# Patient Record
Sex: MALE | Race: WHITE
[De-identification: names, ages, dates, MRNs, and addresses within clinical notes are randomized per-mention and may not be internally consistent; named-entity substitution may affect disease eponyms.]

---

## 2018-03-11 ENCOUNTER — HOSPITAL ENCOUNTER (EMERGENCY)
Dept: HOSPITAL 17 - NEPI | Age: 73
LOS: 1 days | Discharge: HOME | End: 2018-03-12
Payer: MEDICARE

## 2018-03-11 VITALS
OXYGEN SATURATION: 98 % | HEART RATE: 87 BPM | RESPIRATION RATE: 16 BRPM | SYSTOLIC BLOOD PRESSURE: 154 MMHG | DIASTOLIC BLOOD PRESSURE: 89 MMHG

## 2018-03-11 VITALS
OXYGEN SATURATION: 96 % | RESPIRATION RATE: 16 BRPM | DIASTOLIC BLOOD PRESSURE: 85 MMHG | SYSTOLIC BLOOD PRESSURE: 136 MMHG | HEART RATE: 110 BPM

## 2018-03-11 VITALS — OXYGEN SATURATION: 98 %

## 2018-03-11 VITALS — HEIGHT: 70 IN | WEIGHT: 170.31 LBS | BODY MASS INDEX: 24.38 KG/M2

## 2018-03-11 DIAGNOSIS — F10.920: ICD-10-CM

## 2018-03-11 DIAGNOSIS — S09.90XA: ICD-10-CM
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
## 2018-03-11 DIAGNOSIS — S01.81XA: Primary | ICD-10-CM

## 2018-03-11 DIAGNOSIS — Z23: ICD-10-CM

## 2018-03-11 LAB
BASOPHILS # BLD AUTO: 0 TH/MM3 (ref 0–0.2)
BASOPHILS NFR BLD: 0.5 % (ref 0–2)
EOSINOPHIL # BLD: 0 TH/MM3 (ref 0–0.4)
EOSINOPHIL NFR BLD: 0.3 % (ref 0–4)
ERYTHROCYTE [DISTWIDTH] IN BLOOD BY AUTOMATED COUNT: 13.4 % (ref 11.6–17.2)
HCT VFR BLD CALC: 46.2 % (ref 39–51)
HGB BLD-MCNC: 16.2 GM/DL (ref 13–17)
INR PPP: 1 RATIO
LYMPHOCYTES # BLD AUTO: 2.3 TH/MM3 (ref 1–4.8)
LYMPHOCYTES NFR BLD AUTO: 37.1 % (ref 9–44)
MCH RBC QN AUTO: 34.5 PG (ref 27–34)
MCHC RBC AUTO-ENTMCNC: 35.2 % (ref 32–36)
MCV RBC AUTO: 98.1 FL (ref 80–100)
MONOCYTE #: 0.5 TH/MM3 (ref 0–0.9)
MONOCYTES NFR BLD: 8.7 % (ref 0–8)
NEUTROPHILS # BLD AUTO: 3.3 TH/MM3 (ref 1.8–7.7)
NEUTROPHILS NFR BLD AUTO: 53.4 % (ref 16–70)
PLATELET # BLD: 182 TH/MM3 (ref 150–450)
PMV BLD AUTO: 8.2 FL (ref 7–11)
PROTHROMBIN TIME: 10.2 SEC (ref 9.8–11.6)
RBC # BLD AUTO: 4.71 MIL/MM3 (ref 4.5–5.9)
WBC # BLD AUTO: 6.2 TH/MM3 (ref 4–11)

## 2018-03-11 PROCEDURE — 80048 BASIC METABOLIC PNL TOTAL CA: CPT

## 2018-03-11 PROCEDURE — 85730 THROMBOPLASTIN TIME PARTIAL: CPT

## 2018-03-11 PROCEDURE — 90471 IMMUNIZATION ADMIN: CPT

## 2018-03-11 PROCEDURE — 86900 BLOOD TYPING SEROLOGIC ABO: CPT

## 2018-03-11 PROCEDURE — 96374 THER/PROPH/DIAG INJ IV PUSH: CPT

## 2018-03-11 PROCEDURE — 86901 BLOOD TYPING SEROLOGIC RH(D): CPT

## 2018-03-11 PROCEDURE — 70450 CT HEAD/BRAIN W/O DYE: CPT

## 2018-03-11 PROCEDURE — G0390 TRAUMA RESPONS W/HOSP CRITI: HCPCS

## 2018-03-11 PROCEDURE — 72125 CT NECK SPINE W/O DYE: CPT

## 2018-03-11 PROCEDURE — 85025 COMPLETE CBC W/AUTO DIFF WBC: CPT

## 2018-03-11 PROCEDURE — 72170 X-RAY EXAM OF PELVIS: CPT

## 2018-03-11 PROCEDURE — 99291 CRITICAL CARE FIRST HOUR: CPT

## 2018-03-11 PROCEDURE — 86850 RBC ANTIBODY SCREEN: CPT

## 2018-03-11 PROCEDURE — 70486 CT MAXILLOFACIAL W/O DYE: CPT

## 2018-03-11 PROCEDURE — 80307 DRUG TEST PRSMV CHEM ANLYZR: CPT

## 2018-03-11 PROCEDURE — 85610 PROTHROMBIN TIME: CPT

## 2018-03-11 PROCEDURE — 71045 X-RAY EXAM CHEST 1 VIEW: CPT

## 2018-03-11 PROCEDURE — 81001 URINALYSIS AUTO W/SCOPE: CPT

## 2018-03-11 PROCEDURE — 99285 EMERGENCY DEPT VISIT HI MDM: CPT

## 2018-03-11 NOTE — RADRPT
EXAM DATE/TIME:  03/11/2018 19:06 

 

[facility] COMPARISON:     

No previous studies available for comparison.

 

 

INDICATIONS :     

Trauma alert, fall today.

                      

 

RADIATION DOSE:     

56.35 CTDIvol (mGy) 

 

 

 

MEDICAL HISTORY :     

Non-responsive.  

 

SURGICAL HISTORY :      

Non-responsive. 

 

ENCOUNTER:      

Initial

 

ACUITY:      

1 day

 

PAIN SCALE:      

Non-responsive

 

LOCATION:       

Bilateral  head

 

TECHNIQUE:     

Multiple contiguous axial images were obtained of the head.  Using automated exposure control and adj
ustment of the mA and/or kV according to patient size, radiation dose was kept as low as reasonably a
chievable to obtain optimal diagnostic quality images.   DICOM format image data is available electro
nically for review and comparison.  

 

FINDINGS:     

 

CEREBRUM:     

The ventricles, sulci, and basal cisterns are mildly prominent..  No evidence of midline shift, mass 
lesion, hemorrhage or acute infarction.  No extra-axial fluid collections are seen.

 

POSTERIOR FOSSA:     

The cerebellum and brainstem are intact.  The 4th ventricle is midline.  The cerebellopontine angle i
s unremarkable.

 

EXTRACRANIAL:     

The visualized portion of the orbits is intact.

 

SKULL:     

The calvaria is intact.  No evidence of skull fracture.

 

CONCLUSION:     

1. Mild central and cortical atrophy.

2. No acute findings in the brain.

 

 

 

 Virgil Stauffer MD on March 11, 2018 at 19:23           

Board Certified Radiologist.

 This report was verified electronically.

## 2018-03-11 NOTE — RADRPT
EXAM DATE/TIME:  03/11/2018 18:58 

 

[facility] COMPARISON:     

No previous studies available for comparison.

 

                     

INDICATIONS :     

Trauma Alert: fall from a standing position with head laceration.

                     

 

MEDICAL HISTORY :     

None.          

 

SURGICAL HISTORY :     

None.   

 

ENCOUNTER:     

Initial                                        

 

ACUITY:     

1 day      

 

PAIN SCORE:     

Non-responsive.

 

LOCATION:     

Bilateral chest 

 

FINDINGS:     

Frontal view of the chest is performed supine on a trauma backboard.  The lungs are symmetrically aer
ated.  No evidence of midline shift.  Both hemidiaphragms well delineated.

 

CONCLUSION:     The lungs are clear.

 

 

 

 Virgil Stauffer MD on March 11, 2018 at 19:24           

Board Certified Radiologist.

 This report was verified electronically.

## 2018-03-11 NOTE — RADRPT
EXAM DATE/TIME:  03/11/2018 19:06 

 

[facility] COMPARISON:     

No previous studies available for comparison.

 

 

INDICATIONS :     

Trauma alert, fall today.

                      

 

RADIATION DOSE:     

29.8 CTDIvol (mGy) 

 

 

 

MEDICAL HISTORY :     

Non-responsive.  

 

SURGICAL HISTORY :      

Non-responsive. 

 

ENCOUNTER:      

Initial

 

ACUITY:      

1 day

 

PAIN SCALE:      

Non-responsive

 

LOCATION:       

Bilateral neck 

 

TECHNIQUE:     

Volumetric scanning of the cervical spine was performed. Multiplanar reconstructions in the sagittal,
 coronal and oblique axial planes were performed.   Using automated exposure control and adjustment o
f the mA and/or kV according to patient size, radiation dose was kept as low as reasonably achievable
 to obtain optimal diagnostic quality images.   DICOM format image data is available electronically f
or review and comparison.  

 

FINDINGS:     

There is normal alignment of the vertebral bodies of the cervical spine and preservation of vertebral
 body height.  Prominent anterior bridging paravertebral ossification is present anteriorly on the le
ft side at C6-7.  Mild posterior osteophytes are present from C3-C7.  The posterior elements are in n
ormal alignment without evidence of locked or perched facets.  There is fusion of the facet joints on
 the left side at C3-4-5.  The spinous processes are intact.  Soft tissue ossification superficial to
 the spinous processes of C4.  The atlantoaxial articulation is intact.

 

C2-C3:  

No fracture seen.  The neural foramina are patent.

 

C3-C4: 

No fracture seen.  Moderate left sided bony neural foraminal stenosis.

 

C4-C5: 

No fracture seen.  Moderate right-sided bony neural foraminal stenosis.

 

C5-C6: 

No fracture seen.  Moderate left and mild right-sided bony neural foraminal stenosis

 

C6-C7: 

No fracture seen.  Moderate severity left bony neural foraminal stenosis.

 

C7-T1: 

No fracture seen.  The neural foramina are patent.

 

CONCLUSION:     

No acute findings.  No evidence of compression deformity or spondylolisthesis.  

 

 

 

 Virgil Stauffer MD on March 11, 2018 at 19:41           

Board Certified Radiologist.

 This report was verified electronically.

## 2018-03-11 NOTE — RADRPT
EXAM DATE/TIME:  03/11/2018 19:06 

 

[facility] COMPARISON:     

No previous studies available for comparison.

 

 

INDICATIONS :     

Trauma alert, fall today.

                      

 

RADIATION DOSE:     

26.35 CTDIvol (mGy) 

 

 

MEDICAL HISTORY :     

Non-responsive.  

 

SURGICAL HISTORY :      

Non-responsive. 

 

ENCOUNTER:      

Initial

 

ACUITY:      

1 day

 

PAIN SCORE:      

Non-responsive

 

LOCATION:       

Bilateral facial 

 

TECHNIQUE:     

Volumetric scanning of the facial bones was performed.  Using automated exposure control and adjustme
nt of the mA and/or kV according to patient size, radiation dose was kept as low as reasonably achiev
able to obtain optimal diagnostic quality images.  DICOM format image data is available [employer]
y for review and comparison.  

 

FINDINGS:     

 

ORBITS:     

The orbital and infraorbital osseous structures are intact.  The retroconal structures have a normal 
configuration.  No radiopaque foreign bodies are seen.

 

NASAL BONE:     

The nasal bone and maxillary spine are intact

 

ZYGOMATIC ARCHES:     

Symmetric without evidence of fracture.

 

SINUSES:     

The maxillary, ethmoid and frontal sinuses are intact.  No air-fluid levels seen.

 

NASAL CAVITY:     

The nasal septum is intact and midline.  The lacrimal ducts are intact.

 

SOFT TISSUES:     

No radiopaque foreign bodies seen.  No soft-tissue swelling is seen.

 

INTRACRANIAL:     

No intracranial air seen.

 

CRIBIFORM PLATE:     

Grossly intact.

 

CONCLUSION:     

Negative trauma CT facial bones.

 

 

 

 Virgil Stauffer MD on March 11, 2018 at 19:25           

Board Certified Radiologist.

 This report was verified electronically.

## 2018-03-11 NOTE — PD
Physical Exam


Date Seen by Provider:  Mar 11, 2018


Time Seen by Provider:  20:38





Data


Data


Last Documented VS





Vital Signs








  Date Time  Temp Pulse Resp B/P (MAP) Pulse Ox O2 Delivery O2 Flow Rate FiO2


 


3/11/18 19:28  87 16 154/89 (110) 98 Nasal Cannula 2.00 








Orders





 Orders


I-Stat Profile (3/11/18 19:03)


Complete Blood Count With Diff (3/11/18 19:03)


Prothrombin Time / Inr (Pt) (3/11/18 19:03)


Act Partial Throm Time (Ptt) (3/11/18 19:03)


Type And Screen (3/11/18 19:03)


Chest, Single Ap (3/11/18 19:03)


Pelvis, Ap Only (Routine) (3/11/18 19:03)


Ct Brain W/O Iv Contrast(Rout) (3/11/18 19:03)


Ct Cerv Spine W/O Contrast (3/11/18 19:03)


Iv Access Insert/Monitor (3/11/18 19:03)


Ecg Monitoring (3/11/18 19:03)


Oximetry (3/11/18 19:03)


Oxygen Administration (3/11/18 19:03)


Ct Facial Bones W/O Iv Cont (3/11/18 )


Lorazepam Inj (Ativan Inj) (3/11/18 19:15)


Lorazepam Inj (Ativan Inj) (3/11/18 19:15)


Alcohol (Ethanol) (3/11/18 19:39)


Haloperidol Inj (Haldol Inj) (3/11/18 20:15)


Lorazepam Inj (Ativan Inj) (3/11/18 22:15)


**Trauma Office Use Only** (3/11/18 )





Labs





Laboratory Tests








Test


  3/11/18


19:00


 


White Blood Count 6.2 TH/MM3 


 


Red Blood Count 4.71 MIL/MM3 


 


Hemoglobin 16.2 GM/DL 


 


Bedside Hemoglobin 16.3 G/DL 


 


Hematocrit 46.2 % 


 


Bedside Hematocrit 48.0 % 


 


Mean Corpuscular Volume 98.1 FL 


 


Mean Corpuscular Hemoglobin 34.5 PG 


 


Mean Corpuscular Hemoglobin


Concent 35.2 % 


 


 


Red Cell Distribution Width 13.4 % 


 


Platelet Count 182 TH/MM3 


 


Mean Platelet Volume 8.2 FL 


 


Neutrophils (%) (Auto) 53.4 % 


 


Lymphocytes (%) (Auto) 37.1 % 


 


Monocytes (%) (Auto) 8.7 % 


 


Eosinophils (%) (Auto) 0.3 % 


 


Basophils (%) (Auto) 0.5 % 


 


Neutrophils # (Auto) 3.3 TH/MM3 


 


Lymphocytes # (Auto) 2.3 TH/MM3 


 


Monocytes # (Auto) 0.5 TH/MM3 


 


Eosinophils # (Auto) 0.0 TH/MM3 


 


Basophils # (Auto) 0.0 TH/MM3 


 


CBC Comment DIFF FINAL 


 


Differential Comment  


 


Prothrombin Time 10.2 SEC 


 


Prothromb Time International


Ratio 1.0 RATIO 


 


 


Activated Partial


Thromboplast Time 21.6 SEC 


 


 


Bedside Sodium 138 MMOL/L 


 


Bedside Potassium 4.0 MMOL/L 


 


Bedside Chloride 100 MMOL/L 


 


Bedside Blood Urea Nitrogen 6 MG/DL 


 


Bedside Creatinine 1.0 MG/DL 


 


Bedside Glucose 94 MG/DL 


 


Ethyl Alcohol Level 253 MG/DL 











MDM


Supervised Visit with SHARON:  No


Narrative Course


I was asked to evaluate this patient's left forehead laceration.


The patient was initially seen by Dr. Sampson.  Please see his note for full H&P.  


On my exam the patient is belligerent, refusing examination. He says, " Get the 

fuck away from me. Don't fucking touch me." He is intoxicated, smells of 

alcohol. 


I can see a 3-4 cm laceration of the left forehead. No active bleeding. Patient 

may be more agreeable to suturing after he nancy up.


Dr. Sampson retains care of this patient.  Please see his note for disposition.


Diagnosis





 Primary Impression:  


 Closed head injury


 Qualified Codes:  S09.90XA - Unspecified injury of head, initial encounter


 Additional Impressions:  


 Forehead laceration


 Qualified Codes:  S01.81XA - Laceration without foreign body of other part of 

head, initial encounter


 Alcohol intoxication


 Qualified Codes:  F10.920 - Alcohol use, unspecified with intoxication, 

uncomplicated


Patient Instructions:  General Instructions





***Additional Instruction:  


Follow-up with local physician.  Wound care daily.  Keflex as directed.


Scripts


Unable to Obtain Active Prescriptions or Reported Meds


Disposition:  01 DISCHARGE HOME


Condition:  Stable











Donna Membreno Mar 11, 2018 20:40

## 2018-03-11 NOTE — RADRPT
EXAM DATE/TIME:  03/11/2018 18:58 

 

[facility] COMPARISON:     

No previous studies available for comparison.

 

                     

INDICATIONS :     

Trauma Alert: fall from a standing position with head laceration.

                     

 

MEDICAL HISTORY :     

None.          

 

SURGICAL HISTORY :     

None.   

 

ENCOUNTER:     

Initial                                        

 

ACUITY:     

1 day      

 

PAIN SCORE:     

Non-responsive.

 

LOCATION:     

Bilateral pelvis 

 

FINDINGS:     

Frontal view of the pelvis performed on a trauma backboard.  There is mild motion degradation.  No gr
oss bony abnormality seen.  Metallic zippers superimposes upon a portion of the superior and inferior
 left pubic ramus.  The arcuate lines of the sacrum are symmetric.

 

CONCLUSION:     

The bony pelvic ring is intact.  No gross abnormality seen.

 

 

 

 Virgil Stauffer MD on March 11, 2018 at 19:25           

Board Certified Radiologist.

 This report was verified electronically.

## 2018-03-11 NOTE — PD
HPI


Chief Complaint:  Trauma alert


Time Seen by Provider:  19:03


Travel History


International Travel<30 days:  No


Contact w/Intl Traveler<30days:  No


Traveled to known affect area:  No





History of Present Illness


HPI


72-year-old male was brought in trauma alert.  Patient had alcohol consumption 

today.  Patient fell this evening.  No reported loss of consciousness.  Patient 

was combative at the scene.  GCS was 14.  Patient was observed to have his 

laceration the left side of the head.  Patient denies any past medical history.

  Patient denies any routine medication.  Patient denies any allergies to 

medication.  Patient is not sure TD booster status.  Patient complains of left-

sided headache.  Patient denies any neck pain.  Patient denies any chest pain 

or shortness of breath.  Patient denies abdominal pain.  Patient denies any 

extremity injury.  Patient denies any focal weakness or numbness of the 

extremity.





Allergies-Medications


(Allergen,Severity, Reaction):  


Coded Allergies:  


     No Known Allergies (Unverified , 3/11/18)


Reported Meds & Prescriptions





Reported Meds & Active Scripts


Active


Active Prescriptions or Reported Medications Unobtainable    








Review of Systems


General / Constitutional:  No: Fever


Eyes:  No: Visual changes


HENT:  Positive: Headaches


Cardiovascular:  No: Chest Pain or Discomfort


Respiratory:  No: Shortness of Breath


Gastrointestinal:  No: Abdominal Pain


Genitourinary:  No: Dysuria


Musculoskeletal:  No: Pain


Skin:  No Rash


Neurologic:  No: Weakness


Psychiatric:  No: Depression


Endocrine:  No: Polydipsia


Hematologic/Lymphatic:  No: Easy Bruising





Physical Exam


Narrative


GENERAL: Well-nourished, well-developed patient.


SKIN: Focused skin assessment warm/dry.


HEAD: Normocephalic.  Patient has 4 cm laceration left forehead temporal area 

of the scalp.  No active bleeding.


EYES: No scleral icterus. No injection or drainage.  Pupils 2 mm equal reactive.


NECK: Supple, trachea midline. No JVD or lymphadenopathy.  No tenderness on 

palpation of the neck.


CARDIOVASCULAR: Regular rate and rhythm without murmurs, gallops, or rubs. 


RESPIRATORY: Breath sounds equal bilaterally. No accessory muscle use.


GASTROINTESTINAL: Abdomen soft, non-tender, nondistended. 


MUSCULOSKELETAL: No cyanosis, or edema. 


BACK: Nontender without obvious deformity. No CVA tenderness.


Neurologic exam: Patient is awake and alert oriented 3.  Patient is 

uncooperative however answer questions appropriately.  Patient moves all 

extremity well.  No obvious focal neurological deficit.





Data


Data


Last Documented VS





Vital Signs








  Date Time  Temp Pulse Resp B/P (MAP) Pulse Ox O2 Delivery O2 Flow Rate FiO2


 


3/11/18 19:28  87 16 154/89 (110) 98 Nasal Cannula 2.00 








Orders





 Orders


I-Stat Profile (3/11/18 19:03)


Complete Blood Count With Diff (3/11/18 19:03)


Prothrombin Time / Inr (Pt) (3/11/18 19:03)


Act Partial Throm Time (Ptt) (3/11/18 19:03)


Type And Screen (3/11/18 19:03)


Chest, Single Ap (3/11/18 19:03)


Pelvis, Ap Only (Routine) (3/11/18 19:03)


Ct Brain W/O Iv Contrast(Rout) (3/11/18 19:03)


Ct Cerv Spine W/O Contrast (3/11/18 19:03)


Iv Access Insert/Monitor (3/11/18 19:03)


Ecg Monitoring (3/11/18 19:03)


Oximetry (3/11/18 19:03)


Oxygen Administration (3/11/18 19:03)


Ct Facial Bones W/O Iv Cont (3/11/18 )


Lorazepam Inj (Ativan Inj) (3/11/18 19:15)


Lorazepam Inj (Ativan Inj) (3/11/18 19:15)


Alcohol (Ethanol) (3/11/18 19:39)


Haloperidol Inj (Haldol Inj) (3/11/18 20:15)





Labs





Laboratory Tests








Test


  3/11/18


19:00


 


White Blood Count 6.2 TH/MM3 


 


Red Blood Count 4.71 MIL/MM3 


 


Hemoglobin 16.2 GM/DL 


 


Bedside Hemoglobin 16.3 G/DL 


 


Hematocrit 46.2 % 


 


Bedside Hematocrit 48.0 % 


 


Mean Corpuscular Volume 98.1 FL 


 


Mean Corpuscular Hemoglobin 34.5 PG 


 


Mean Corpuscular Hemoglobin


Concent 35.2 % 


 


 


Red Cell Distribution Width 13.4 % 


 


Platelet Count 182 TH/MM3 


 


Mean Platelet Volume 8.2 FL 


 


Neutrophils (%) (Auto) 53.4 % 


 


Lymphocytes (%) (Auto) 37.1 % 


 


Monocytes (%) (Auto) 8.7 % 


 


Eosinophils (%) (Auto) 0.3 % 


 


Basophils (%) (Auto) 0.5 % 


 


Neutrophils # (Auto) 3.3 TH/MM3 


 


Lymphocytes # (Auto) 2.3 TH/MM3 


 


Monocytes # (Auto) 0.5 TH/MM3 


 


Eosinophils # (Auto) 0.0 TH/MM3 


 


Basophils # (Auto) 0.0 TH/MM3 


 


CBC Comment DIFF FINAL 


 


Differential Comment  


 


Prothrombin Time 10.2 SEC 


 


Prothromb Time International


Ratio 1.0 RATIO 


 


 


Activated Partial


Thromboplast Time 21.6 SEC 


 


 


Bedside Sodium 138 MMOL/L 


 


Bedside Potassium 4.0 MMOL/L 


 


Bedside Chloride 100 MMOL/L 


 


Bedside Blood Urea Nitrogen 6 MG/DL 


 


Bedside Creatinine 1.0 MG/DL 


 


Bedside Glucose 94 MG/DL 











[facility]


Medical Screen Exam Complete:  Yes


Emergency Medical Condition:  Yes


Interpretation(s)





Last Impressions








Pelvis X-Ray 3/11/18 1903 Signed





Impressions: 





 Service Date/Time:  Sunday, March 11, 2018 18:58 - CONCLUSION:  The bony 

pelvic 





 ring is intact.  No gross abnormality seen.     Virgil Stauffer MD 


 


Head CT 3/11/18 1903 Signed





Impressions: 





 Service Date/Time:  Sunday, March 11, 2018 19:06 - CONCLUSION:  1. Mild 

central 





 and cortical atrophy. 2. No acute findings in the brain.     Virgil Stauffer MD 


 


Chest X-Ray 3/11/18 1903 Signed





Impressions: 





 Service Date/Time:  Sunday, March 11, 2018 18:58 - CONCLUSION: The lungs are 





 clear.     Virgil Stauffer MD 


 


Cervical Spine CT 3/11/18 1903 Signed





Impressions: 





 Service Date/Time:  Sunday, March 11, 2018 19:06 - CONCLUSION:  No acute 





 findings.  No evidence of compression deformity or spondylolisthesis.       





 Virgil Stauffer MD 


 


Maxillofacial CT 3/11/18 0000 Signed





Impressions: 





 Service Date/Time:  Sunday, March 11, 2018 19:06 - CONCLUSION:  Negative 

trauma 





 CT facial bones.     Virgil Stauffer MD 








Differential Diagnosis


Differential diagnosis including head injury, neck injury, facial injury, chest 

injury, abdominal injury, extremity injury.


Narrative Course


72-year-old male was found in by EMS trauma alert.  Patient was intoxicated and 

fell and has had injury.  Td booster given.  Ancef 2 g IV given.  Ativan IV as 

necessary for controlling combative behavior.  Haldol 5 mg IM given.  Patient 

refused suture repair of the left forehead scalp laceration.


Diagnosis


Diagnosis:  


 Primary Impression:  


 Closed head injury


 Qualified Codes:  S09.90XA - Unspecified injury of head, initial encounter


 Additional Impressions:  


 Forehead laceration


 Qualified Codes:  S01.81XA - Laceration without foreign body of other part of 

head, initial encounter


 Alcohol intoxication


 Qualified Codes:  F10.920 - Alcohol use, unspecified with intoxication, 

uncomplicated


Patient Instructions:  General Instructions





***Additional Instructions:  


Follow-up with local physician.  Wound care daily.  Keflex as directed.


***Med/Other Pt SpecificInfo:  No Meds Exist/No RX given


Scripts


Unable to Obtain Active Prescriptions or Reported Meds


Disposition:  01 DISCHARGE HOME


Condition:  Stable











Weston Sampson MD Mar 11, 2018 19:18

## 2018-03-12 VITALS
RESPIRATION RATE: 15 BRPM | DIASTOLIC BLOOD PRESSURE: 88 MMHG | HEART RATE: 106 BPM | OXYGEN SATURATION: 94 % | SYSTOLIC BLOOD PRESSURE: 144 MMHG

## 2018-03-12 VITALS
DIASTOLIC BLOOD PRESSURE: 89 MMHG | OXYGEN SATURATION: 96 % | HEART RATE: 130 BPM | SYSTOLIC BLOOD PRESSURE: 126 MMHG | RESPIRATION RATE: 18 BRPM

## 2018-03-12 VITALS
DIASTOLIC BLOOD PRESSURE: 82 MMHG | SYSTOLIC BLOOD PRESSURE: 165 MMHG | RESPIRATION RATE: 18 BRPM | HEART RATE: 100 BPM | OXYGEN SATURATION: 100 %

## 2018-03-12 LAB
COLOR UR: (no result)
GLUCOSE UR STRIP-MCNC: (no result) MG/DL
HGB UR QL STRIP: (no result)
KETONES UR STRIP-MCNC: (no result) MG/DL
NITRITE UR QL STRIP: (no result)
SP GR UR STRIP: 1.01 (ref 1–1.03)
URINE LEUKOCYTE ESTERASE: (no result)

## 2018-03-12 NOTE — PD
Physical Exam


Date Seen by Provider:  Mar 12, 2018


Narrative


This patient came in last night as a trauma alert.  He had been drinking and 

fell and hit his head.  He was worked up from a trauma aspect and no acute 

injuries were found.  His alcohol level was 253.  He was kept here overnight as 

a "sleep it off."





At 9 AM this morning, the patient's mental status remains clearly altered.  He 

had been given Ativan prior to my evaluation of the patient.  I am told by the 

PA that he was able to tell her his name but was disoriented to month/day/year.

  He was unable to recall the name of his friend to come pick him up.





The patient admitted to drinking 3 12-packs of beer per day and was tachycardic 

this morning.  He was given Ativan for presumed alcohol withdrawal.





This patient is very disheveled.  He has dried blood on the left side of his 

head.  He has feces on his feet.  He will open his eyes when spoken to but does 

not speak and appears confused.  He is not ready for discharge at this point.





I have reviewed his studies from last night.  I have added a urine drug screen 

and a urinalysis.





We are attempting to find out this patient's name so that I can review records.





Data


Data


Last Documented VS





Vital Signs








  Date Time  Temp Pulse Resp B/P (MAP) Pulse Ox O2 Delivery O2 Flow Rate FiO2


 


3/12/18 08:25  106 15 144/88 (106) 94 Room Air  


 


3/12/18 01:24       2.00 


 


3/11/18 18:57        21








Orders





 Orders


I-Stat Profile (3/11/18 19:03)


Complete Blood Count With Diff (3/11/18 19:03)


Prothrombin Time / Inr (Pt) (3/11/18 19:03)


Act Partial Throm Time (Ptt) (3/11/18 19:03)


Type And Screen (3/11/18 19:03)


Chest, Single Ap (3/11/18 19:03)


Pelvis, Ap Only (Routine) (3/11/18 19:03)


Ct Brain W/O Iv Contrast(Rout) (3/11/18 19:03)


Ct Cerv Spine W/O Contrast (3/11/18 19:03)


Iv Access Insert/Monitor (3/11/18 19:03)


Ecg Monitoring (3/11/18 19:03)


Oximetry (3/11/18 19:03)


Oxygen Administration (3/11/18 19:03)


Ct Facial Bones W/O Iv Cont (3/11/18 )


Lorazepam Inj (Ativan Inj) (3/11/18 19:15)


Lorazepam Inj (Ativan Inj) (3/11/18 19:15)


Alcohol (Ethanol) (3/11/18 19:39)


Haloperidol Inj (Haldol Inj) (3/11/18 20:15)


Lorazepam Inj (Ativan Inj) (3/11/18 22:15)


**Trauma Office Use Only** (3/11/18 )


Lorazepam Inj (Ativan Inj) (3/12/18 08:30)


Sodium Chlor 0.9% 1000 Ml Inj (Ns 1000 M (3/12/18 08:30)


Cath For Specimen (3/12/18 09:33)


Urinalysis - C+S If Indicated (3/12/18 09:33)


Drug Screen, Random Urine (3/12/18 09:33)


Diet Regular Basic (3/12/18 Lunch)


Ed Discharge Order (3/12/18 12:46)





Labs





Laboratory Tests








Test


  3/11/18


19:00 3/12/18


11:30


 


White Blood Count 6.2 TH/MM3  


 


Red Blood Count 4.71 MIL/MM3  


 


Hemoglobin 16.2 GM/DL  


 


Bedside Hemoglobin 16.3 G/DL  


 


Hematocrit 46.2 %  


 


Bedside Hematocrit 48.0 %  


 


Mean Corpuscular Volume 98.1 FL  


 


Mean Corpuscular Hemoglobin 34.5 PG  


 


Mean Corpuscular Hemoglobin


Concent 35.2 % 


  


 


 


Red Cell Distribution Width 13.4 %  


 


Platelet Count 182 TH/MM3  


 


Mean Platelet Volume 8.2 FL  


 


Neutrophils (%) (Auto) 53.4 %  


 


Lymphocytes (%) (Auto) 37.1 %  


 


Monocytes (%) (Auto) 8.7 %  


 


Eosinophils (%) (Auto) 0.3 %  


 


Basophils (%) (Auto) 0.5 %  


 


Neutrophils # (Auto) 3.3 TH/MM3  


 


Lymphocytes # (Auto) 2.3 TH/MM3  


 


Monocytes # (Auto) 0.5 TH/MM3  


 


Eosinophils # (Auto) 0.0 TH/MM3  


 


Basophils # (Auto) 0.0 TH/MM3  


 


CBC Comment DIFF FINAL  


 


Differential Comment   


 


Prothrombin Time 10.2 SEC  


 


Prothromb Time International


Ratio 1.0 RATIO 


  


 


 


Activated Partial


Thromboplast Time 21.6 SEC 


  


 


 


Bedside Sodium 138 MMOL/L  


 


Bedside Potassium 4.0 MMOL/L  


 


Bedside Chloride 100 MMOL/L  


 


Bedside Blood Urea Nitrogen 6 MG/DL  


 


Bedside Creatinine 1.0 MG/DL  


 


Bedside Glucose 94 MG/DL  


 


Ethyl Alcohol Level 253 MG/DL  


 


Urine Color  LIGHT-YELLOW 


 


Urine Turbidity  CLEAR 


 


Urine pH  6.5 


 


Urine Specific Gravity  1.006 


 


Urine Protein  NEG mg/dL 


 


Urine Glucose (UA)  NEG mg/dL 


 


Urine Ketones  NEG mg/dL 


 


Urine Occult Blood  NEG 


 


Urine Nitrite  NEG 


 


Urine Bilirubin  NEG 


 


Urine Urobilinogen


  


  LESS THAN 2.0


MG/DL


 


Urine Leukocyte Esterase  NEG 


 


Urine RBC


  


  LESS THAN 1


/hpf


 


Urine WBC  1 /hpf 


 


Microscopic Urinalysis Comment


  


  CATH-CULT NOT


IND


 


Urine Opiates Screen  NEG 


 


Urine Barbiturates Screen  NEG 


 


Urine Amphetamines Screen  NEG 


 


Urine Benzodiazepines Screen  NEG 


 


Urine Cocaine Screen  NEG 


 


Urine Cannabinoids Screen  NEG 











MDM


Supervised Visit with SHARNO:  No


Differential Diagnosis


Differential diagnosis of altered mental status includes but is not limited to 

infection, electrolyte abnormality, neurological event, intoxication


Narrative Course





Last Impressions








Pelvis X-Ray 3/11/18 1903 Signed





Impressions: 





 Service Date/Time:  Sunday, March 11, 2018 18:58 - CONCLUSION:  The bony 

pelvic 





 ring is intact.  No gross abnormality seen.     Virgil Stauffer MD 


 


Head CT 3/11/18 1903 Signed





Impressions: 





 Service Date/Time:  Sunday, March 11, 2018 19:06 - CONCLUSION:  1. Mild 

central 





 and cortical atrophy. 2. No acute findings in the brain.     Virgil Stauffer MD 


 


Chest X-Ray 3/11/18 1903 Signed





Impressions: 





 Service Date/Time:  Sunday, March 11, 2018 18:58 - CONCLUSION: The lungs are 





 clear.     Virgil Stauffer MD 


 


Cervical Spine CT 3/11/18 1903 Signed





Impressions: 





 Service Date/Time:  Sunday, March 11, 2018 19:06 - CONCLUSION:  No acute 





 findings.  No evidence of compression deformity or spondylolisthesis.       





 Virgil Stauffer MD 


 


Maxillofacial CT 3/11/18 0000 Signed





Impressions: 





 Service Date/Time:  Sunday, March 11, 2018 19:06 - CONCLUSION:  Negative 

trauma 





 CT facial bones.     Virgil Stauffer MD 











CBC Diagram


3/11/18 19:00














I-STAT chemistries were normal.  Alcohol level 253.





12:45 PM


This patient is much more lucid.  He now has a ride here for him.





UA is negative for infection.  Urine tox screen is also negative.  He is now 

stable for discharge.


Diagnosis





 Primary Impression:  


 Closed head injury


 Qualified Codes:  S09.90XA - Unspecified injury of head, initial encounter


 Additional Impressions:  


 Forehead laceration


 Qualified Codes:  S01.81XA - Laceration without foreign body of other part of 

head, initial encounter


 Alcohol intoxication


 Qualified Codes:  F10.920 - Alcohol use, unspecified with intoxication, 

uncomplicated


Patient Instructions:  General Instructions, Laceration (ED), Alcohol 

Intoxication (ED)


Departure Forms:  Tests/Procedures





***Additional Instruction:  


Follow-up with local physician.  Wound care daily.  Keflex as directed.


Scripts


Unable to Obtain Active Prescriptions or Reported Meds


Disposition:  01 DISCHARGE HOME


Condition:  Stable











Ana Mazariegos MD Mar 12, 2018 09:48